# Patient Record
Sex: MALE | URBAN - METROPOLITAN AREA
[De-identification: names, ages, dates, MRNs, and addresses within clinical notes are randomized per-mention and may not be internally consistent; named-entity substitution may affect disease eponyms.]

---

## 2018-05-13 ENCOUNTER — HOSPITAL ENCOUNTER (EMERGENCY)
Facility: CLINIC | Age: 23
Discharge: HOME | End: 2018-05-13
Attending: EMERGENCY MEDICINE | Admitting: EMERGENCY MEDICINE
Payer: COMMERCIAL

## 2018-05-13 ENCOUNTER — APPOINTMENT (OUTPATIENT)
Dept: GENERAL RADIOLOGY | Facility: CLINIC | Age: 23
End: 2018-05-13
Attending: EMERGENCY MEDICINE
Payer: COMMERCIAL

## 2018-05-13 VITALS
DIASTOLIC BLOOD PRESSURE: 79 MMHG | WEIGHT: 242 LBS | BODY MASS INDEX: 37.98 KG/M2 | TEMPERATURE: 98.8 F | OXYGEN SATURATION: 100 % | HEIGHT: 67 IN | RESPIRATION RATE: 20 BRPM | SYSTOLIC BLOOD PRESSURE: 148 MMHG

## 2018-05-13 DIAGNOSIS — R07.89 CHEST WALL PAIN: ICD-10-CM

## 2018-05-13 LAB — INTERPRETATION ECG - MUSE: NORMAL

## 2018-05-13 PROCEDURE — 93005 ELECTROCARDIOGRAM TRACING: CPT

## 2018-05-13 PROCEDURE — 99284 EMERGENCY DEPT VISIT MOD MDM: CPT | Mod: 25

## 2018-05-13 PROCEDURE — 25000132 ZZH RX MED GY IP 250 OP 250 PS 637: Performed by: EMERGENCY MEDICINE

## 2018-05-13 PROCEDURE — 71046 X-RAY EXAM CHEST 2 VIEWS: CPT

## 2018-05-13 RX ORDER — IBUPROFEN 400 MG/1
800 TABLET, FILM COATED ORAL ONCE
Status: COMPLETED | OUTPATIENT
Start: 2018-05-13 | End: 2018-05-13

## 2018-05-13 RX ADMIN — IBUPROFEN 800 MG: 400 TABLET ORAL at 11:31

## 2018-05-13 ASSESSMENT — ENCOUNTER SYMPTOMS
SHORTNESS OF BREATH: 0
FEVER: 0
COUGH: 0

## 2018-05-13 NOTE — ED PROVIDER NOTES
"  History     Chief Complaint:  Chest pain    HPI   Silvestre Woods is a 22 year old male who presents with chest pain. The patient reports that he began experiencing an aching left sided chest pain two days ago that has persisted since that time. There was no strenuous activity preceding this, and he denies any history of similar pain in the past. Upon arrival here in the ED this morning he denies any fever, cough, shortness of breath, or other associated symptoms. There is no pleuritic component to his pain. He notes that he has taken one baby Aspirin at home prior to arrival.     Cardiac/PE/DVT Risk Factors:  History of hypertension - No  History of hyperlipidemia - No  History of diabetes - No  History of smoking - Yes  Personal history of PE/DVT - No  Family history of PE/DVT - No  Family history of heart complications - No  Recent travel - No  Recent surgery - No  Other immobilizations - No  Cancer - No    Allergies:  No Known Drug Allergies     Medications:    The patient is not currently taking any prescribed medications.    Past Medical History:    The patient denies any relevant past medical history.    Past Surgical History:    History reviewed. No pertinent past surgical history.    Family History:    The patient denies any relevant family medical history.    Social History:  Smoking Status: Yes  Smokeless Tobacco: No  Alcohol Use: No  Marital Status: N/A     Review of Systems   Constitutional: Negative for fever.   Respiratory: Negative for cough and shortness of breath.    Cardiovascular: Positive for chest pain.   All other systems reviewed and are negative.    Physical Exam   Vitals:  Patient Vitals for the past 24 hrs:   BP Temp Temp src Heart Rate Resp SpO2 Height Weight   05/13/18 1105 148/79 98.8  F (37.1  C) Oral 70 16 100 % 1.702 m (5' 7\") 109.8 kg (242 lb)     Physical Exam  Nursing note and vitals reviewed.    Constitutional:  Appears well-developed and well-nourished, comfortable. "    HENT:    No evidence of facial or scalp trauma.      Nose normal.  No discharge.      Oropharynx is clear and moist.  Eyes:    Conjunctivae are normal without injection. No lid droop.     Pupils are equal, round, and reactive to light.      Right eye exhibits no discharge. Left eye exhibits no discharge.      No scleral icterus.  Lymph:  No enlarged or tender cervical or submandibular lymph nodes.   Cardiovascular:  Normal rate, regular rhythm with normal S1 and S2.      Normal heart sounds and peripheral pulses 2+ and equal.       No murmur or devon.  Pulmonary:  Effort normal and breath sounds clear to auscultation bilaterally.      No respiratory distress.  No stridor.     No wheezes. No rales. Reproducible left anterior chest wall pain in the lateral lower breast area. Painful intercostal space.    GI:    Soft. No distension and no mass. No tenderness.      No rebound and no guarding. No flank pain.  No HSM.  Musculoskeletal:  Normal range of motion. No extremity deformity.     No edema and no tenderness.  No cyanosis.                                       Neck supple, no midline cervical tenderness.   Neurological:   Alert and oriented. No cranial nerve deficit, no facial droop.     Exhibits good muscle tone. Coordination normal. Gait is steady.     GCS eye subscore is 4. GCS verbal subscore is 5.      GCS motor subscore is 6.   Skin:    Skin is warm and dry. No rash noted. No diaphoresis. Line of skin tags on the left neck.     No erythema. No pallor.  No lesions.  Psychiatric:   Behavior is normal. Appropriate mood and affect.     Judgment and thought content normal.     Emergency Department Course     ECG:  ECG taken at 1106, ECG read at 1110  Sinus bradycardia with sinus arrhythmia  Otherwise normal ECG  Rate 59 bpm. SD interval 148. QRS duration 94. QT/QTc 358/354. P-R-T axes 11 72 35.    Imaging:  Radiology findings were communicated with the patient who voiced understanding of the findings.  Chest  XR, PA & LAT:  No active areas of infiltrate.     Per Radiologist.      Interventions:  1131 Ibuprofen, 800 mg, PO     Emergency Department Course:  Nursing notes and vitals reviewed.  EKG obtained in the ED, see results above.   1113 I had my initial encounter with the patient.  I performed an exam of the patient as documented above.   The patient was sent for a XR while in the emergency department, results above.     1152 I discussed the treatment plan with the patient. They expressed understanding of this plan and consented to discharge. They will be discharged home with instructions for care and follow up. In addition, the patient will return to the emergency department if their symptoms persist, worsen, if new symptoms arise or if there is any concern.  All questions were answered.    Impression & Plan      Medical Decision Making:  Silvestre Woods is a 22 year old male who presents to the ED for evaluation of chest wall pain. Upon presentation to the ED, the patient is nontoxic appearing. His vital signs are within normal limits and stable. On exam, he is well appearing. The patient is alert, oriented, and neurologic exam is nonfocal. Cardiopulmonary exam is normal. Abdomen is soft and nontender throughout. The rest of his exam is as mentioned above.    Differential includes, but is not limited to, ACS, PE, pneumonia, pneumothorax, aortic dissection, esophageal rupture, pericarditis, GERD, or musculoskeletal chest wall pain. An EKG was obtained and demonstrates sinus rhythm. There are no concerning acute ischemic changes. Labs were obtained and are as mentioned above. A chest x-ray was obtained and demonstrates no evidence of an acute cardiopulmonary process.     Given the patient's unremarkable chest XR I feel that pneumonia and pneumothorax are unlikely. The patient's history and presentation are not consistent with aortic dissection, esophageal rupture, or pericarditis, therefore I feel these  diagnoses are unlikely.   The patient is PERC negative, therefore I feel that PE is less likely and further evaluation for this is not indicated at this time. The patient's history and presentation are not consistent with esophageal rupture or pericarditis, therefore I feel these diagnoses are less likely.  Given the unremarkable EKG, I feel that ACS is also unlikely. In addition, the patient's history and presentation is very atypical for ACS.     Upon repeat evaluation after interventions above, the patient notes that his symptoms have improved. With regard to the patient's chest pain, given that he is well appearing with an unremarkable workup, I do feel that the patient can be discharged to home.  His symptoms are most consistent with intercostal muscle strain with reproducible pain on palpation.  Return instructions were given. The patient was stable/improved at the time of discharge.    Diagnosis:    ICD-10-CM    1. Chest wall pain R07.89     Left intercostal muscle strain     Disposition:   Discharge to home. Motrin 800 every 6-8 hours as needed.  Avoid activity that makes the pain worse.  Otherwise no restrictions and activity as tolerated.  Recheck in the clinic this week if symptoms are not resolving or getting worse.    Scribe Disclosure:  MARISA, Ras Zheng, am serving as a scribe at 11:14 AM on 5/13/2018 to document services personally performed by Kamla Rodney MD, based on my observations and the provider's statements to me.    5/13/2018    EMERGENCY DEPARTMENT       Kamla Rodney MD  05/13/18 0213

## 2018-05-13 NOTE — ED AVS SNAPSHOT
Emergency Department    [address] [place] [address]    Phone:  [phone]    Fax:  [phone]                                       Silvestre Woods   MRN: [id number]    Department:   Emergency Department   Date of Visit:  5/13/2018           After Visit Summary Signature Page     I have received my discharge instructions, and my questions have been answered. I have discussed any challenges I see with this plan with the nurse or doctor.    ..........................................................................................................................................  Patient/Patient Representative Signature      ..........................................................................................................................................  Patient Representative Print Name and Relationship to Patient    ..................................................               ................................................  Date                                            Time    ..........................................................................................................................................  Reviewed by Signature/Title    ...................................................              ..............................................  Date                                                            Time

## 2018-05-13 NOTE — ED AVS SNAPSHOT
Emergency Department    [address] [place] [address]    Phone:  [phone]    Fax:  [phone]                                       Silvestre Woods   MRN: [id number]    Department:   Emergency Department   Date of Visit:  5/13/2018           Patient Information     Date Of Birth          1995        Your diagnoses for this visit were:     Chest wall pain Left intercostal muscle strain       You were seen by Kamla Rodney MD.      Follow-up Information     Schedule an appointment as soon as possible for a visit to follow up.    Why:  If symptoms worsen        Discharge Instructions       Motrin 800 every 6-8 hours as needed.  Avoid activity that makes the pain worse.  Otherwise no restrictions and activity as tolerated.  Recheck in the clinic this week if symptoms are not resolving or getting worse.        *CHEST PAIN, NONCARDIAC    Based on your visit today, the exact cause of your chest pain is not certain. Your condition does not seem serious and your pain does not appear to be coming from your heart. However, sometimes the signs of a serious problem take more time to appear. Therefore, please watch for the warning signs listed below.  HOME CARE:  1. Rest today and avoid strenuous activity.  2. Take any prescribed medicine as directed.  FOLLOW UP with your doctor in 1-3 days.   GET PROMPT MEDICAL ATTENTION if any of the following occur:    A change in the type of pain: if it feels different, becomes more severe, lasts longer, or begins to spread into your shoulder, arm, neck, jaw or back    Shortness of breath or increased pain with breathing    Cough with blood or dark colored sputum (phlegm)    Weakness, dizziness, or fainting    Fever over 101  F (38.3  C)    Swelling, pain or redness in one leg    7498-4747 [employer]. [address] [place] [address], [place] [address]. All rights reserved. This information is not intended as a substitute for professional  medical care. Always follow your healthcare professional's instructions.  This information has been modified by your health care provider with permission from the publisher.      24 Hour Appointment Hotline       To make an appointment at any [facility], call [phone] ([phone]). If you don't have a family doctor or clinic, we will help you find one. [facility] are conveniently located to serve the needs of you and your family.             Review of your medicines      Notice     You have not been prescribed any medications.            Procedures and tests performed during your visit     Chest XR,  PA & LAT    EKG 12-lead, tracing only      Orders Needing Specimen Collection     None      Pending Results     No orders found from 5/11/2018 to 5/14/2018.            Pending Culture Results     No orders found from 5/11/2018 to 5/14/2018.            Pending Results Instructions     If you had any lab results that were not finalized at the time of your Discharge, you can call the ED Lab Result RN at [phone]. You will be contacted by this team for any positive Lab results or changes in treatment. The nurses are available 7 days a week from 10A to 6:30P.  You can leave a message 24 hours per day and they will return your call.        Test Results From Your Hospital Stay        5/13/2018 11:54 AM      Narrative     XR CHEST 2 VW   5/13/2018 11:43 AM     HISTORY: left chest pain;     COMPARISON: None.    FINDINGS: The heart is negative.  The lungs are clear. The pulmonary  vasculature is normal.  The bones and soft tissues are unremarkable.        Impression     IMPRESSION: No active areas of infiltrate.        FEDERICA HARDY MD                Clinical Quality Measure: Blood Pressure Screening     Your blood pressure was checked while you were in the emergency department today. The last reading we obtained was  BP: 148/79 . Please read the guidelines below about what these numbers mean and what you  "should do about them.  If your systolic blood pressure (the top number) is less than 120 and your diastolic blood pressure (the bottom number) is less than 80, then your blood pressure is normal. There is nothing more that you need to do about it.  If your systolic blood pressure (the top number) is 120-139 or your diastolic blood pressure (the bottom number) is 80-89, your blood pressure may be higher than it should be. You should have your blood pressure rechecked within a year by a primary care provider.  If your systolic blood pressure (the top number) is 140 or greater or your diastolic blood pressure (the bottom number) is 90 or greater, you may have high blood pressure. High blood pressure is treatable, but if left untreated over time it can put you at risk for heart attack, stroke, or kidney failure. You should have your blood pressure rechecked by a primary care provider within the next 4 weeks.  If your provider in the emergency department today gave you specific instructions to follow-up with your doctor or provider even sooner than that, you should follow that instruction and not wait for up to 4 weeks for your follow-up visit.        Thank you for choosing [facility]       Thank you for choosing [facility] for your care. Our goal is always to provide you with excellent care. Hearing back from our patients is one way we can continue to improve our services. Please take a few minutes to complete the written survey that you may receive in the mail after you visit with us. Thank you!        [employer] Information     [employer] lets you send messages to your doctor, view your test results, renew your prescriptions, schedule appointments and more. To sign up, go to [email or web address] . Click on \"Log in\" on the left side of the screen, which will take you to the Welcome page. Then click on \"Sign up Now\" on the right side of the page.     You will be asked to enter the access code listed below, as well as some " personal information. Please follow the directions to create your username and password.     Your access code is: [email or web address]  Expires: 2018 12:03 PM     Your access code will  in 90 days. If you need help or a new code, please call your [facility] clinic or [phone].        Care EveryWhere ID     This is your Care EveryWhere ID. This could be used by other organizations to access your [facility] medical records  [id number]        Equal Access to Services     [facility] : Viridiana jayo Somarj, waaxda luqadaha, qaybta kaalmada adealicia, filiberto frey . So [facility] [phone].    ATENCIÓN: Si habla español, tiene a tamez disposición servicios gratuitos de asistencia lingüística. Llame al [phone].    We comply with applicable federal civil rights laws and [place] laws. We do not discriminate on the basis of race, color, national origin, age, disability, sex, sexual orientation, or gender identity.            After Visit Summary       This is your record. Keep this with you and show to your community pharmacist(s) and doctor(s) at your next visit.

## 2018-05-13 NOTE — DISCHARGE INSTRUCTIONS
Motrin 800 every 6-8 hours as needed.  Avoid activity that makes the pain worse.  Otherwise no restrictions and activity as tolerated.  Recheck in the clinic this week if symptoms are not resolving or getting worse.        *CHEST PAIN, NONCARDIAC    Based on your visit today, the exact cause of your chest pain is not certain. Your condition does not seem serious and your pain does not appear to be coming from your heart. However, sometimes the signs of a serious problem take more time to appear. Therefore, please watch for the warning signs listed below.  HOME CARE:  1. Rest today and avoid strenuous activity.  2. Take any prescribed medicine as directed.  FOLLOW UP with your doctor in 1-3 days.   GET PROMPT MEDICAL ATTENTION if any of the following occur:    A change in the type of pain: if it feels different, becomes more severe, lasts longer, or begins to spread into your shoulder, arm, neck, jaw or back    Shortness of breath or increased pain with breathing    Cough with blood or dark colored sputum (phlegm)    Weakness, dizziness, or fainting    Fever over 101  F (38.3  C)    Swelling, pain or redness in one leg    1271-0183 [employer]. [address] [place] [address]. All rights reserved. This information is not intended as a substitute for professional medical care. Always follow your healthcare professional's instructions.  This information has been modified by your health care provider with permission from the publisher.